# Patient Record
Sex: MALE | Race: WHITE | NOT HISPANIC OR LATINO | Employment: STUDENT | URBAN - METROPOLITAN AREA
[De-identification: names, ages, dates, MRNs, and addresses within clinical notes are randomized per-mention and may not be internally consistent; named-entity substitution may affect disease eponyms.]

---

## 2018-03-06 DIAGNOSIS — R07.9 CHEST PAIN: Primary | ICD-10-CM

## 2018-03-28 ENCOUNTER — HOSPITAL ENCOUNTER (OUTPATIENT)
Dept: CARDIOLOGY | Facility: CLINIC | Age: 48
Discharge: HOME | End: 2018-03-28
Admitting: FAMILY MEDICINE
Payer: COMMERCIAL

## 2018-03-28 DIAGNOSIS — R07.9 CHEST PAIN: ICD-10-CM

## 2018-03-28 PROCEDURE — 93325 DOPPLER ECHO COLOR FLOW MAPG: CPT | Mod: 26 | Performed by: INTERNAL MEDICINE

## 2018-03-28 PROCEDURE — 93016 CV STRESS TEST SUPVJ ONLY: CPT | Performed by: INTERNAL MEDICINE

## 2018-03-28 PROCEDURE — 93018 CV STRESS TEST I&R ONLY: CPT | Performed by: INTERNAL MEDICINE

## 2018-03-28 PROCEDURE — 93321 DOPPLER ECHO F-UP/LMTD STD: CPT | Mod: 26 | Performed by: INTERNAL MEDICINE

## 2018-03-28 PROCEDURE — 93350 STRESS TTE ONLY: CPT | Mod: 26 | Performed by: INTERNAL MEDICINE

## 2018-03-28 PROCEDURE — 25500064 ZZH RX 255 OP 636

## 2018-03-28 PROCEDURE — 93350 STRESS TTE ONLY: CPT | Mod: TC

## 2018-03-28 RX ADMIN — HUMAN ALBUMIN MICROSPHERES AND PERFLUTREN 9 ML: 10; .22 INJECTION, SOLUTION INTRAVENOUS at 10:32

## 2019-07-12 ENCOUNTER — APPOINTMENT (OUTPATIENT)
Dept: CT IMAGING | Facility: CLINIC | Age: 49
End: 2019-07-12
Attending: EMERGENCY MEDICINE
Payer: COMMERCIAL

## 2019-07-12 ENCOUNTER — HOSPITAL ENCOUNTER (EMERGENCY)
Facility: CLINIC | Age: 49
Discharge: HOME | End: 2019-07-12
Attending: EMERGENCY MEDICINE | Admitting: EMERGENCY MEDICINE
Payer: COMMERCIAL

## 2019-07-12 ENCOUNTER — APPOINTMENT (OUTPATIENT)
Dept: MRI IMAGING | Facility: CLINIC | Age: 49
End: 2019-07-12
Attending: EMERGENCY MEDICINE
Payer: COMMERCIAL

## 2019-07-12 VITALS
DIASTOLIC BLOOD PRESSURE: 83 MMHG | RESPIRATION RATE: 13 BRPM | HEART RATE: 75 BPM | TEMPERATURE: 98.6 F | OXYGEN SATURATION: 100 % | SYSTOLIC BLOOD PRESSURE: 122 MMHG

## 2019-07-12 DIAGNOSIS — R20.0 FACIAL NUMBNESS: ICD-10-CM

## 2019-07-12 DIAGNOSIS — R20.0 RIGHT ARM NUMBNESS: ICD-10-CM

## 2019-07-12 LAB
ANION GAP SERPL CALCULATED.3IONS-SCNC: 6 MMOL/L (ref 3–14)
APTT PPP: 28 SEC (ref 22–37)
BASOPHILS # BLD AUTO: 0 10E9/L (ref 0–0.2)
BASOPHILS NFR BLD AUTO: 0.2 %
BUN SERPL-MCNC: 13 MG/DL (ref 7–30)
CALCIUM SERPL-MCNC: 9 MG/DL (ref 8.5–10.1)
CHLORIDE SERPL-SCNC: 105 MMOL/L (ref 94–109)
CO2 SERPL-SCNC: 30 MMOL/L (ref 20–32)
CREAT SERPL-MCNC: 1.08 MG/DL (ref 0.66–1.25)
DIFFERENTIAL METHOD BLD: NORMAL
EOSINOPHIL # BLD AUTO: 0.1 10E9/L (ref 0–0.7)
EOSINOPHIL NFR BLD AUTO: 1.9 %
ERYTHROCYTE [DISTWIDTH] IN BLOOD BY AUTOMATED COUNT: 13.3 % (ref 10–15)
GFR SERPL CREATININE-BSD FRML MDRD: 82 ML/MIN/{1.73_M2}
GLUCOSE SERPL-MCNC: 83 MG/DL (ref 70–99)
HCT VFR BLD AUTO: 43.4 % (ref 40–53)
HGB BLD-MCNC: 14.5 G/DL (ref 13.3–17.7)
IMM GRANULOCYTES # BLD: 0 10E9/L (ref 0–0.4)
IMM GRANULOCYTES NFR BLD: 0.2 %
INR PPP: 0.92 (ref 0.86–1.14)
INTERPRETATION ECG - MUSE: NORMAL
LYMPHOCYTES # BLD AUTO: 2.3 10E9/L (ref 0.8–5.3)
LYMPHOCYTES NFR BLD AUTO: 35.8 %
MCH RBC QN AUTO: 30 PG (ref 26.5–33)
MCHC RBC AUTO-ENTMCNC: 33.4 G/DL (ref 31.5–36.5)
MCV RBC AUTO: 90 FL (ref 78–100)
MONOCYTES # BLD AUTO: 0.5 10E9/L (ref 0–1.3)
MONOCYTES NFR BLD AUTO: 7 %
NEUTROPHILS # BLD AUTO: 3.5 10E9/L (ref 1.6–8.3)
NEUTROPHILS NFR BLD AUTO: 54.9 %
NRBC # BLD AUTO: 0 10*3/UL
NRBC BLD AUTO-RTO: 0 /100
PLATELET # BLD AUTO: 175 10E9/L (ref 150–450)
POTASSIUM SERPL-SCNC: 3.7 MMOL/L (ref 3.4–5.3)
RBC # BLD AUTO: 4.84 10E12/L (ref 4.4–5.9)
SODIUM SERPL-SCNC: 141 MMOL/L (ref 133–144)
TROPONIN I BLD-MCNC: 0 UG/L (ref 0–0.08)
WBC # BLD AUTO: 6.4 10E9/L (ref 4–11)

## 2019-07-12 PROCEDURE — 25000128 H RX IP 250 OP 636: Performed by: EMERGENCY MEDICINE

## 2019-07-12 PROCEDURE — 70450 CT HEAD/BRAIN W/O DYE: CPT

## 2019-07-12 PROCEDURE — 85025 COMPLETE CBC W/AUTO DIFF WBC: CPT | Performed by: EMERGENCY MEDICINE

## 2019-07-12 PROCEDURE — 25500064 ZZH RX 255 OP 636: Performed by: EMERGENCY MEDICINE

## 2019-07-12 PROCEDURE — 80048 BASIC METABOLIC PNL TOTAL CA: CPT | Performed by: EMERGENCY MEDICINE

## 2019-07-12 PROCEDURE — 70498 CT ANGIOGRAPHY NECK: CPT

## 2019-07-12 PROCEDURE — 99285 EMERGENCY DEPT VISIT HI MDM: CPT | Mod: 25

## 2019-07-12 PROCEDURE — 99207 ZZC NON BILLABLE SERVICE FOR UNTIMELY FILING: CPT | Performed by: PSYCHIATRY & NEUROLOGY

## 2019-07-12 PROCEDURE — 84484 ASSAY OF TROPONIN QUANT: CPT

## 2019-07-12 PROCEDURE — 70553 MRI BRAIN STEM W/O & W/DYE: CPT

## 2019-07-12 PROCEDURE — 0042T CT HEAD PERFUSION WITH CONTRAST: CPT

## 2019-07-12 PROCEDURE — 25000125 ZZHC RX 250: Performed by: EMERGENCY MEDICINE

## 2019-07-12 PROCEDURE — 93005 ELECTROCARDIOGRAM TRACING: CPT

## 2019-07-12 PROCEDURE — 85730 THROMBOPLASTIN TIME PARTIAL: CPT | Performed by: EMERGENCY MEDICINE

## 2019-07-12 PROCEDURE — A9585 GADOBUTROL INJECTION: HCPCS | Performed by: EMERGENCY MEDICINE

## 2019-07-12 PROCEDURE — 85610 PROTHROMBIN TIME: CPT | Performed by: EMERGENCY MEDICINE

## 2019-07-12 PROCEDURE — 96360 HYDRATION IV INFUSION INIT: CPT

## 2019-07-12 RX ORDER — IOPAMIDOL 755 MG/ML
120 INJECTION, SOLUTION INTRAVASCULAR ONCE
Status: COMPLETED | OUTPATIENT
Start: 2019-07-12 | End: 2019-07-12

## 2019-07-12 RX ORDER — GADOBUTROL 604.72 MG/ML
10 INJECTION INTRAVENOUS ONCE
Status: COMPLETED | OUTPATIENT
Start: 2019-07-12 | End: 2019-07-12

## 2019-07-12 RX ADMIN — SODIUM CHLORIDE 100 ML: 9 INJECTION, SOLUTION INTRAVENOUS at 19:57

## 2019-07-12 RX ADMIN — IOPAMIDOL 120 ML: 755 INJECTION, SOLUTION INTRAVENOUS at 19:57

## 2019-07-12 RX ADMIN — GADOBUTROL 10 ML: 604.72 INJECTION INTRAVENOUS at 21:02

## 2019-07-12 RX ADMIN — SODIUM CHLORIDE 500 ML: 9 INJECTION, SOLUTION INTRAVENOUS at 21:38

## 2019-07-12 ASSESSMENT — ENCOUNTER SYMPTOMS
SPEECH DIFFICULTY: 1
NUMBNESS: 1

## 2019-07-12 NOTE — ED AVS SNAPSHOT
Emergency Department  [address] [facility] [address]  Phone:  [phone]  Fax:  [phone]                                    Federico Ramesh   MRN: [id number]    Department:   Emergency Department   Date of Visit:  7/12/2019           After Visit Summary Signature Page    I have received my discharge instructions, and my questions have been answered. I have discussed any challenges I see with this plan with the nurse or doctor.    ..........................................................................................................................................  Patient/Patient Representative Signature      ..........................................................................................................................................  Patient Representative Print Name and Relationship to Patient    ..................................................               ................................................  Date                                   Time    ..........................................................................................................................................  Reviewed by Signature/Title    ...................................................              ..............................................  Date                                               Time          22EPIC Rev 08/18

## 2019-07-12 NOTE — LETTER
July 12, 2019      To Whom It May Concern:      Federico Ramesh was seen in our Emergency Department today, 07/12/19.  I expect his condition to improve over the next 1-2 days.  He may return to school when improved.    Sincerely,        Greg Rome RN

## 2019-07-13 NOTE — ED PROVIDER NOTES
"  History     Chief Complaint:  Numbness    The history is provided by the patient.      Federico Ramesh is a 48 year old male who presents to the ED with numbness. Per patient, around 1925, he was seated and doing homework when he suddenly \"felt weird.\" The patient indicated his right arm and right side of his mouth felt numb and his speech became slurred. He notes he was hearing sounds differently as well, and he felt a heaviness in his lips, and his right arm, leg, and toes. He states he had no control of his right arm at that time, but it raised up on its own. He presents to the ED out of concern for these symptoms. The patient denies any vision changes.    Allergies:  NKDA    Medications:    Effexor   Bupropion     Past Medical History:    Migraine  Depression    Past Surgical History:    Vasectomy    Family History:    HLD    Social History:  Presents with wife and daughter  Marital Status:   [2]  Never smoker  Occasional alcohol use    Review of Systems   Eyes: Negative for visual disturbance.   Neurological: Positive for speech difficulty and numbness.        Heaviness in lips, right arm, leg, toes   All other systems reviewed and are negative.      Physical Exam     Patient Vitals for the past 24 hrs:   BP Temp Temp src Pulse Heart Rate Resp SpO2   07/12/19 2142 -- -- -- -- -- -- 100 %   07/12/19 2135 122/83 -- -- 75 -- -- 99 %   07/12/19 2045 114/90 -- -- 70 75 13 --   07/12/19 2033 -- 98.6  F (37  C) Oral -- -- -- --   07/12/19 2029 -- -- -- -- 74 10 99 %   07/12/19 1949 (!) 141/94 -- -- 82 -- -- --     Physical Exam  SKIN:  Warm, dry.    HEMATOLOGIC/IMMUNOLOGIC/LYMPHATIC:  No pallor.  HENT: No JVD.  Active painless range of motion of head and neck.  EYES:  Conjunctivae normal.  Normal extraocular motion.  Pupils equal round and reactive to light.  Visual fields intact.  CARDIOVASCULAR:  Regular rate and rhythm.  No murmur.  No carotid bruit.  Normal right radial pulse.  RESPIRATORY:  No " respiratory distress, breath sounds equal and normal.  GASTROINTESTINAL:  Soft, nontender abdomen.  MUSCULOSKELETAL:  Full upper and lower extremity active range of motion.  NEUROLOGIC:  Alert, conversant.  Oriented to self place and time.  No gross motor or sensory deficit.  No ataxia.  No aphasia or dysarthria.  NIH stroke scale score is 0  PSYCHIATRIC:  Normal mood.    Emergency Department Course   ECG:  Time: 2024  Vent. Rate 73 bpm. WI interval 162. QRS duration 82. QT/QTc 404/445. P-R-T axis 29 8 7.  Normal sinus rhythm  Low voltage QRS  Borderline ECG  Read time: 2032    Imaging:  Radiographic findings were communicated with the patient who voiced understanding of the findings.    CT Head Perfusion w Contrast  1. Small subtle area of transit time prolongation corresponding to the  left frontoparietal white matter. This is indeterminate.  2. Apparent transit time prolongation corresponding to the left  cerebellum, probably artifactual. As per radiology.    CTA Head Neck with Contrast  CTA Head: Unremarkable.   CTA Neck: Unremarkable. As per radiology.    CT Head w/o Contrast  No evidence of acute ischemia or hemorrhage. As per radiology.    MR Brain w/o & w Contrast  No evidence for recent ischemic change. No mass or mass  effect. No abnormal enhancement. Less significant details are provided  above. As per radiology    Laboratory:  CBC: WBC: 6.4, HGB:14.5, PLT: 175  BMP: Glucose 83, o/w WNL (Creatinine: 1.08)    INR: 0.92    PTT: 28    2033 Troponin I: 0.00    Interventions:  2138 NS Bolus 500 mL IV    Emergency Department Course:  1945 Patient arrived to the ED. Code Stroke was called in triage.    Nursing notes and vitals reviewed. 1951 I performed an exam of the patient as documented above.     1953 I spoke with Dr. Delgado, stroke neurology, regarding the patient.    IV inserted. Medicine administered as documented above. Blood drawn. This was sent to the lab for further testing, results above.    The  patient was sent for a CT Head Perfusion, CT Head, CTA Head Neck, MR Brain while in the emergency department, findings above.     2002 I spoke with Dr. Blanchard, radiology, regarding the patient.    2021 I spoke with Dr. Delgado, stroke neurology, regarding the patient. Code Stroke was deescalated. Dr. Delgado advised obtaining MR imaging for further workup.    2159 I rechecked the patient and discussed the results of his workup thus far.     Findings and plan explained to the Patient. Patient discharged home with instructions regarding supportive care, medications, and reasons to return. The importance of close follow-up was reviewed.     I personally reviewed the laboratory results with the Patient and answered all related questions prior to discharge.    Impression & Plan      Medical Decision Making:  Federico Ramesh is a 48 year old male who presents to the emergency department for symptoms that suggested an acute stroke, so he underwent a CVA evaluation, which proved negative, including an MRI. It is unclear to what exactly is causing his symptoms. The consulted neurologist stated the patient could be discharged if negative MRI and continued improvement, which is the case. Follow up advised, but I instructed the patient to return if any concerns in the mean time.    Diagnosis:    ICD-10-CM   1. Facial numbness R20.0   2. Right arm numbness R20.0       Disposition:  discharged to home    I, Jose R Moscoso, am serving as a scribe on 7/12/2019 at 11:57 PM to personally document services performed by Malcom Gr MD based on my observations and the provider's statements to me.     I, Isabela Blue, am serving as a scribe on 7/12/2019 at 8:40 PM to personally document services performed by Malcom Gr MD based on my observations and the provider's statements to me.     Isabela Blue  7/12/2019    EMERGENCY DEPARTMENT       Malcom Gr MD  07/13/19 1050

## 2019-07-13 NOTE — CONSULTS
"[facility]      Stroke Code Note    Reason for Consult:  Stroke Code    Chief Complaint: Numbness (Pt complains of right sided facial numbness and right arm weakness that started at 1915)      History of Present Illness     Federico Ramesh is a 48 year old male with migraine without aura, depression, HL, who presents with \"feeling in a fog\" which progressed to right arm weakness and movement that were not his own, and slurred speech difficulty speaking.  During this time his wife was talking to him, but her voice sounded extremely loud and distant, as if it was coming from behind him.  After about 20-25 minutes his symptoms began to resolve.  His arm felt better and then a few minutes later his speech improved.  He feels nearly back to normal, but still notes subjective cognitive slowing.  He has never had an episode like this before.      Assessment & Plan     Impression  Alteration in awareness and right-hemiparesis of unclear etiology    TPA Treatment was Not given due to minor / isolated / quickly resolving stroke symptoms..     Endovascular Treatment was Not initiated due to absence of proximal vessel occlusion    Recommendations  Event is atypical for stroke given gradual spread of symptoms and resolution pattern.    Ddx: partial complex seizure, migraine aura, TIA, vs. Other    Obtain MRI Brain w/wo contrast  If negative, can follow-up with outpatient neurology with consideration of EEG      Please contact the Stroke Service with any questions.  Danny Delgado MD, MS  Vascular Neurology    Text Page ([phone])  __________________________________________________    Past Medical History   No past medical history on file.    Past Surgical History   No past surgical history on file.    Medications     Home Meds  Prior to Admission medications    Not on File       Allergies   Allergies not on file    Family History   No family history on file.    Social History   Social History     Tobacco Use "     Smoking status: Not on file   Substance Use Topics     Alcohol use: Not on file     Drug use: Not on file       Review of Systems   The 10 point Review of Systems is negative other than noted in the HPI or here.     Physical Exam     Temp:  [98.6  F (37  C)] 98.6  F (37  C)  Pulse:  [82] 82  Heart Rate:  [74] 74  Resp:  [10] 10  BP: (141)/(94) 141/94  SpO2:  [99 %] 99 %    Neuro:  Mental status: Awake, alert, attentive, oriented x3. Speech is fluent (subjectively slowed), comprehension and repetition intact. No dysarthria.  Good historian.  No neglect.  Cranial nerves: EOMI, visual fields full, face symmetric, facial sensation intact, shoulder shrug strong, tongue/uvula midline, hearing intact to conversation.  Motor: 5/5 strength in all 4 extremities without drift.  Sensory (assessed via nursing): Intact to light touch in face, arms, and legs  Coordination: Finger to nose intact bilaterally without dysmetria.  Normal heel-shin test bilaterally  Gait: Deferred    Stroke Scales      NIHSS  Interval and Comments Interval: baseline (07/12/19 2024)   1a. Level of Consciousness 0-->Alert, keenly responsive   1b. LOC Questions 0-->Answers both questions correctly   1c. LOC Commands 0-->Performs both tasks correctly   2.   Best Gaze 0-->Normal   3.   Visual 0-->No visual loss   4.   Facial Palsy 0-->Normal symmetrical movements   5a. Motor Arm, Left 0-->No drift, limb holds 90 (or 45) degrees for full 10 secs   5b. Motor Arm, Right 0-->No drift, limb holds 90 (or 45) degrees for full 10 secs   6a. Motor Leg, Left 0-->No drift, leg holds 30 degree position for full 5 secs   6b. Motor Leg, right 0-->No drift, leg holds 30 degree position for full 5 secs   7.   Limb Ataxia 0-->Absent   8.   Sensory 0-->Normal, no sensory loss   9.   Best Language 0-->No aphasia, normal   10. Dysarthria 0-->Normal   11. Extinction and Inattention  0-->No abnormality   Total 0 (07/12/19 2024)       Data     Imaging  I personally reviewed  all neuroimaging    Labs:  CBC:  Recent Labs   Lab 07/12/19 1951   WBC 6.4   RBC 4.84   HGB 14.5   HCT 43.4          Basic Metabolic Panel:   Recent Labs   Lab Test 07/12/19 1951      POTASSIUM 3.7   CHLORIDE 105   CO2 30   BUN 13   CR 1.08   GLC 83   ADONIS 9.0       Liver panel:  No lab results found.    INR:  Recent Labs   Lab Test 07/12/19 1951   INR 0.92        Lipid Profile:No lab results found.    A1C: No lab results found.    Troponin I: No lab results found.         I have personally spent a total of 50 minutes providing critical care services supervising this patient's stroke code activation.  I personally reviewed all lab values and radiology images.  I evaluated and directed care for the patient's critical condition..     Stroke Code Data  (for stroke code with tele)  Stroke code activated 07/12/19 1949   First stroke provider response 07/12/19 1952   Video start time 07/12/19 2008   Video end time 07/12/19 2019   Last known normal 07/12/19 1900   Time of discovery  (or onset of symptoms)  07/12/19 1900   Head CT read by me 07/12/19 2000   Was stroke code de-escalated? Yes 07/12/19 2020  other (see comments) Symptoms resolved      Telestroke Service Details  Type of service telemedicine diagnostic assessment of acute neurological changes   Reason telemedicine is appropriate patient requires assessment with a specialist for diagnosis and treatment of neurological symptoms   Mode of transmission secure interactive audio and video communication per [employer]   Originating site (patient location) [facility]    Distant site (provider location) Provider remote site

## 2019-07-13 NOTE — ED NOTES
Pt noted right facial numbness, slurred speech, and right arm numbness at 1915 that has since resolved.  Pt states these symptoms resolved after 5-10 minutes.

## 2019-08-07 ENCOUNTER — MEDICAL CORRESPONDENCE (OUTPATIENT)
Dept: HEALTH INFORMATION MANAGEMENT | Facility: CLINIC | Age: 49
End: 2019-08-07

## 2019-08-08 DIAGNOSIS — G45.9 TIA (TRANSIENT ISCHEMIC ATTACK): ICD-10-CM

## 2019-08-08 DIAGNOSIS — I48.91 A-FIB (H): Primary | ICD-10-CM

## 2019-08-08 DIAGNOSIS — G45.9 TIA (TRANSIENT ISCHEMIC ATTACK): Primary | ICD-10-CM

## 2019-08-14 ENCOUNTER — HOSPITAL ENCOUNTER (OUTPATIENT)
Dept: CARDIOLOGY | Facility: CLINIC | Age: 49
Discharge: HOME | End: 2019-08-14
Attending: PSYCHIATRY & NEUROLOGY | Admitting: PSYCHIATRY & NEUROLOGY
Payer: COMMERCIAL

## 2019-08-14 DIAGNOSIS — G45.9 TIA (TRANSIENT ISCHEMIC ATTACK): ICD-10-CM

## 2019-08-14 DIAGNOSIS — I48.91 A-FIB (H): ICD-10-CM

## 2019-08-14 PROCEDURE — 25500064 ZZH RX 255 OP 636: Performed by: PSYCHIATRY & NEUROLOGY

## 2019-08-14 PROCEDURE — 40000264 ECHOCARDIOGRAM COMPLETE

## 2019-08-14 PROCEDURE — 0296T ZIO PATCH HOLTER ADULT PEDIATRIC GREATER THAN 48 HRS: CPT

## 2019-08-14 PROCEDURE — 0298T ZIO PATCH HOLTER ADULT PEDIATRIC GREATER THAN 48 HRS: CPT | Performed by: INTERNAL MEDICINE

## 2019-08-14 PROCEDURE — 93306 TTE W/DOPPLER COMPLETE: CPT | Mod: 26 | Performed by: INTERNAL MEDICINE

## 2019-08-14 RX ADMIN — HUMAN ALBUMIN MICROSPHERES AND PERFLUTREN 5 ML: 10; .22 INJECTION, SOLUTION INTRAVENOUS at 10:15

## 2019-11-05 ENCOUNTER — HEALTH MAINTENANCE LETTER (OUTPATIENT)
Age: 49
End: 2019-11-05

## 2020-11-22 ENCOUNTER — HEALTH MAINTENANCE LETTER (OUTPATIENT)
Age: 50
End: 2020-11-22

## 2021-09-19 ENCOUNTER — HEALTH MAINTENANCE LETTER (OUTPATIENT)
Age: 51
End: 2021-09-19

## 2022-01-09 ENCOUNTER — HEALTH MAINTENANCE LETTER (OUTPATIENT)
Age: 52
End: 2022-01-09

## 2022-11-20 ENCOUNTER — HEALTH MAINTENANCE LETTER (OUTPATIENT)
Age: 52
End: 2022-11-20

## 2023-04-15 ENCOUNTER — HEALTH MAINTENANCE LETTER (OUTPATIENT)
Age: 53
End: 2023-04-15

## 2024-05-30 ENCOUNTER — TRANSCRIBE ORDERS (OUTPATIENT)
Dept: OTHER | Age: 54
End: 2024-05-30

## 2024-06-26 ENCOUNTER — MEDICAL CORRESPONDENCE (OUTPATIENT)
Dept: HEALTH INFORMATION MANAGEMENT | Facility: CLINIC | Age: 54
End: 2024-06-26

## 2024-06-26 ENCOUNTER — TRANSCRIBE ORDERS (OUTPATIENT)
Dept: OTHER | Age: 54
End: 2024-06-26

## 2024-06-26 DIAGNOSIS — I26.99 PULMONARY EMBOLISM (H): Primary | ICD-10-CM
